# Patient Record
Sex: FEMALE
[De-identification: names, ages, dates, MRNs, and addresses within clinical notes are randomized per-mention and may not be internally consistent; named-entity substitution may affect disease eponyms.]

---

## 2017-05-11 NOTE — MAMMOGRAPHY REPORT
IMPLANT MAMMOGRAM:

Bilateral breast imaging was done with standard and displacement 

technique. Heterogeneously dense parenchyma is symmetrically seen 

ventral to each saline implant. The implant contours are smooth.  No 

suspicious findings or secondary signs of malignancy are seen. No 

significant change identified compared to prior exam in December 2015.



CAD was utilized.  



CONCLUSION:

Negative implant mammogram.



RECOMMENDATION:

Routine follow-up.



BI-RADS CATEGORY:  1 = Negative



ACR BI-RADS MAMMOGRAPHIC CODES:

0 = Needs additional imaging evaluation; 1 = Negative; 2 = Benign;

3 = Probably benign; 4 = Suspicious; 5 = Malignant; 6 = Known

biopsy-proven malignancy



COMMENT:

      1.   Dense breast tissue, i.e., adenosis, fibrocystic 

            changes, etc., may obscure an underlying neoplasm.

      2.   Approximately 10% of cancers are not detected with

            mammography.

      3.   A negative mammography report should not delay biopsy 

            if a clinically suspicious mass is present.



COMMENT:

Patient follow-up letters are generated in [employer].

## 2018-05-30 ENCOUNTER — HOSPITAL ENCOUNTER (OUTPATIENT)
Dept: HOSPITAL 5 - SPVWC | Age: 58
Discharge: HOME | End: 2018-05-30
Attending: OBSTETRICS & GYNECOLOGY
Payer: COMMERCIAL

## 2018-05-30 DIAGNOSIS — Z12.31: Primary | ICD-10-CM

## 2018-05-30 PROCEDURE — 77067 SCR MAMMO BI INCL CAD: CPT

## 2018-05-31 NOTE — MAMMOGRAPHY REPORT
BILATERAL DIGITAL AUGMENTED SCREENING MAMMOGRAM with CAD: 05/30/18 

11:01:00



CLINICAL: Routine screening.



COMPARISON:[facility] 2 thousand seventeen



FINDINGS: Screening views with and without implant displacement 

demonstrate heterogeneously dense breasts, which may obscure small 

masses. A right subareolar asymmetric density with architectural 

distortion on the implant displaced CC view requires additional 

imaging. No suspicious calcifications.  The left breast is negative.  

Intact subglandular implants.



IMPRESSION: Right subareolar asymmetry requiring additional imaging.



BI-RADS CATEGORY:  0 -- Needs Additional Imaging



RECOMMENDATION: Recall for right implant displaced CC and LM 

magnification nipple views and right breast ultrasound if needed.



ACR BI-RADS MAMMOGRAPHIC CODES:

0 = Needs additional imaging evaluation; 1 = Negative; 2 = Benign; 3 = 

Probably benign; 4 = Suspicious; 5 = Malignant; 6 = Known biopsy-proven 

malignancy



COMMENT:

      1.   Dense breast tissue, i.e., adenosis, fibrocystic 

            changes, etc., may obscure an underlying neoplasm.

      2.   Approximately 10% of cancers are not detected with

            mammography.

      3.   A negative mammography report should not delay biopsy 

            if a clinically suspicious mass is present.



COMMENT:

Patient follow-up letters are generated via our [email or web address] application.

## 2018-06-08 ENCOUNTER — HOSPITAL ENCOUNTER (OUTPATIENT)
Dept: HOSPITAL 5 - SPVWC | Age: 58
Discharge: HOME | End: 2018-06-08
Attending: OBSTETRICS & GYNECOLOGY
Payer: COMMERCIAL

## 2018-06-08 DIAGNOSIS — N64.89: Primary | ICD-10-CM

## 2018-06-08 NOTE — ULTRASOUND REPORT
RIGHT DIGITAL DIAGNOSTIC MAMMOGRAM an RIGHT BREAST ULTRASOUND: 06/08/18 

10:00:00



CLINICAL: Recalled for retroareolar asymmetry and architectural 

distortion on the CC view.



COMPARISON:05/30/18 screening



FINDINGS: Lateralmedial, MLO and CC magnification nipple views were 

performed. A retroareolar asymmetry with architectural distortion 

persists on the CC view but is not identified on other views. 



Ultrasound of the retroareolar right breast was performed and 

demonstrated normal fibroglandular structures with no mass or shadowing 

to correlate with the mammographic finding.



IMPRESSION: A probably benign right retroareolar asymmetry with 

architectural distortion identified only on one mammographic view with 

a negative ultrasound.



BI-RADS CATEGORY:  3 -- Probably Benign



RECOMMENDATION: 6 month followup right mammogram and ultrasound if 

needed.



ACR BI-RADS MAMMOGRAPHIC CODES:

0 = Needs additional imaging evaluation; 1 = Negative; 2 = Benign; 3 = 

Probably benign; 4 = Suspicious; 5 = Malignant; 6 = Known biopsy-proven 

malignancy



COMMENT:

      1.   Dense breast tissue, i.e., adenosis, fibrocystic 

            changes, etc., may obscure an underlying neoplasm.

      2.   Approximately 10% of cancers are not detected with

            mammography.

      3.   A negative mammography report should not delay biopsy 

            if a clinically suspicious mass is present.





COMMENT:

Patient follow-up letters are generated via our [employer] application.

## 2018-06-08 NOTE — MAMMOGRAPHY REPORT
RIGHT DIGITAL DIAGNOSTIC MAMMOGRAM an RIGHT BREAST ULTRASOUND: 06/08/18 

10:00:00



CLINICAL: Recalled for retroareolar asymmetry and architectural 

distortion on the CC view.



COMPARISON:05/30/18 screening



FINDINGS: Lateralmedial, MLO and CC magnification nipple views were 

performed. A retroareolar asymmetry with architectural distortion 

persists on the CC view but is not identified on other views. 



Ultrasound of the retroareolar right breast was performed and 

demonstrated normal fibroglandular structures with no mass or shadowing 

to correlate with the mammographic finding.



IMPRESSION: A probably benign right retroareolar asymmetry with 

architectural distortion identified only on one mammographic view with 

a negative ultrasound.



BI-RADS CATEGORY:  3 -- Probably Benign



RECOMMENDATION: 6 month followup right mammogram and ultrasound if 

needed.



ACR BI-RADS MAMMOGRAPHIC CODES:

0 = Needs additional imaging evaluation; 1 = Negative; 2 = Benign; 3 = 

Probably benign; 4 = Suspicious; 5 = Malignant; 6 = Known biopsy-proven 

malignancy



COMMENT:

      1.   Dense breast tissue, i.e., adenosis, fibrocystic 

            changes, etc., may obscure an underlying neoplasm.

      2.   Approximately 10% of cancers are not detected with

            mammography.

      3.   A negative mammography report should not delay biopsy 

            if a clinically suspicious mass is present.





COMMENT:

Patient follow-up letters are generated via our [employer] application.

## 2019-01-31 ENCOUNTER — HOSPITAL ENCOUNTER (OUTPATIENT)
Dept: HOSPITAL 5 - SPVWC | Age: 59
Discharge: HOME | End: 2019-01-31
Attending: OBSTETRICS & GYNECOLOGY
Payer: COMMERCIAL

## 2019-01-31 DIAGNOSIS — R92.8: Primary | ICD-10-CM

## 2019-01-31 NOTE — ULTRASOUND REPORT
Right mammogram followed by sonogram subareolar area right breast: 

Compared to 6/8/18.



History: Followup of subareolar density right breast with negative 

sonogram.



Findings:



Asymmetric densities again noted in the subareolar area right breast 

without significant interval change. Sonographic examination of the 

area reveals no cystic or solid mass.



Impression:



Benign findings. Annual follow with mammogram recommended. BI-RADS 

CATEGORY:  2 = Benign



ACR BI-RADS MAMMOGRAPHIC CODES:

0 = Needs additional imaging evaluation; 1 = Negative; 2 = Benign; 3 = 

Probably benign; 4 = Suspicious; 5 = Malignant; 6 = Known biopsy-proven 

malignancy



COMMENT:

      1.   Dense breast tissue, i.e., adenosis, fibrocystic 

            changes, etc., may obscure an underlying neoplasm.

      2.   Approximately 10% of cancers are not detected with

            mammography.

      3.   A negative mammography report should not delay biopsy 

            if a clinically suspicious mass is present. COMMENT:

Patient follow-up letters are generated in [employer].

## 2019-01-31 NOTE — MAMMOGRAPHY REPORT
Right mammogram followed by sonogram subareolar area right breast: 

Compared to 6/8/18.



History: Followup of subareolar density right breast with negative 

sonogram.



Findings:



Asymmetric densities again noted in the subareolar area right breast 

without significant interval change. Sonographic examination of the 

area reveals no cystic or solid mass.



Impression:



Benign findings. Annual follow with mammogram recommended. BI-RADS 

CATEGORY:  2 = Benign



ACR BI-RADS MAMMOGRAPHIC CODES:

0 = Needs additional imaging evaluation; 1 = Negative; 2 = Benign; 3 = 

Probably benign; 4 = Suspicious; 5 = Malignant; 6 = Known biopsy-proven 

malignancy



COMMENT:

      1.   Dense breast tissue, i.e., adenosis, fibrocystic 

            changes, etc., may obscure an underlying neoplasm.

      2.   Approximately 10% of cancers are not detected with

            mammography.

      3.   A negative mammography report should not delay biopsy 

            if a clinically suspicious mass is present. COMMENT:

Patient follow-up letters are generated in [employer].